# Patient Record
Sex: FEMALE | Race: WHITE | NOT HISPANIC OR LATINO | URBAN - METROPOLITAN AREA
[De-identification: names, ages, dates, MRNs, and addresses within clinical notes are randomized per-mention and may not be internally consistent; named-entity substitution may affect disease eponyms.]

---

## 2020-07-25 ENCOUNTER — TELEPHONE ENCOUNTER (OUTPATIENT)
Dept: URBAN - METROPOLITAN AREA CLINIC 13 | Facility: CLINIC | Age: 60
End: 2020-07-25

## 2020-07-25 RX ORDER — LACTULOSE 10 G/15ML
TAKE 30 ML TWICE DAILY SOLUTION ORAL
Qty: 60 | Refills: 3 | OUTPATIENT
Start: 2018-01-18 | End: 2018-04-26

## 2020-07-25 RX ORDER — CITALOPRAM HYDROBROMIDE 10 MG
TAKE 1 TABLET DAILY TABLET ORAL
Refills: 0 | OUTPATIENT
End: 2018-08-15

## 2020-07-25 RX ORDER — COLCHICINE 0.6 MG/1
TAKE 1 TABLET 3 TIMES DAILY TABLET, FILM COATED ORAL
Qty: 90 | Refills: 1 | OUTPATIENT
Start: 2018-09-28 | End: 2018-10-24

## 2020-07-25 RX ORDER — PRUCALOPRIDE 2 MG/1
TAKE 1 TABLET DAILY TABLET, FILM COATED ORAL
Qty: 30 | Refills: 1 | OUTPATIENT
Start: 2019-05-02 | End: 2019-07-07

## 2020-07-25 RX ORDER — LUBIPROSTONE 8 UG/1
TAKE 1 CAPSULE TWICE DAILY WITH FOOD CAPSULE, GELATIN COATED ORAL
Refills: 0 | OUTPATIENT
Start: 2007-06-01 | End: 2007-07-12

## 2020-07-25 RX ORDER — POLYETHYLENE GLYCOL 3350, SODIUM CHLORIDE, SODIUM BICARBONATE AND POTASSIUM CHLORIDE WITH LEMON FLAVOR 420; 11.2; 5.72; 1.48 G/4L; G/4L; G/4L; G/4L
USE AS DIRECTED POWDER, FOR SOLUTION ORAL
Qty: 1 | Refills: 0 | OUTPATIENT
Start: 2012-10-17 | End: 2017-07-14

## 2020-07-25 RX ORDER — POLYETHYLENE GLYCOL 3350, SODIUM CHLORIDE, SODIUM BICARBONATE AND POTASSIUM CHLORIDE WITH LEMON FLAVOR 420; 11.2; 5.72; 1.48 G/4L; G/4L; G/4L; G/4L
TAKE 1/2 GALLON AT 5:00 PM DAY BEFORE PROCEDURE, TAKE SECOND 1/2 OF GALLON 6 HRS PRIOR TO PROCEDURE POWDER, FOR SOLUTION ORAL
Qty: 1 | Refills: 0 | OUTPATIENT
Start: 2017-07-14 | End: 2018-01-18

## 2020-07-25 RX ORDER — LINACLOTIDE 145 UG/1
TAKE 1 CAPSULE DAILY EMPTY STOMACH, 30 MINUTES BEFORE BREAKFAST CAPSULE, GELATIN COATED ORAL
Qty: 30 | Refills: 5 | OUTPATIENT
Start: 2017-07-14 | End: 2019-08-07

## 2020-07-25 RX ORDER — COLCHICINE 0.6 MG/1
TAKE 1 TABLET TWICE DAILY AS DIRECTED TABLET, FILM COATED ORAL
Qty: 60 | Refills: 1 | OUTPATIENT
Start: 2012-10-17 | End: 2017-07-14

## 2020-07-25 RX ORDER — LINACLOTIDE 145 UG/1
TAKE  1  CAPSULE DAILY CAPSULE, GELATIN COATED ORAL
Qty: 90 | Refills: 3 | OUTPATIENT
End: 2018-08-15

## 2020-07-26 ENCOUNTER — TELEPHONE ENCOUNTER (OUTPATIENT)
Dept: URBAN - METROPOLITAN AREA CLINIC 13 | Facility: CLINIC | Age: 60
End: 2020-07-26

## 2020-07-26 RX ORDER — RIFAXIMIN 550 MG/1
TABLET ORAL
Qty: 42 | Refills: 0 | Status: ACTIVE | COMMUNITY
Start: 2019-08-20

## 2020-07-26 RX ORDER — OLMESARTAN MEDOXOMIL-HYDROCHLOROTHIAZIDE 12.5; 2 MG/1; MG/1
TAKE 1 TABLET DAILY TABLET, FILM COATED ORAL
Refills: 0 | Status: ACTIVE | COMMUNITY

## 2020-07-26 RX ORDER — TRIAZOLAM 0.25 MG/1
TABLET ORAL
Qty: 5 | Refills: 0 | Status: ACTIVE | COMMUNITY
Start: 2012-02-21

## 2020-07-26 RX ORDER — HYDROCODONE BITARTRATE AND ACETAMINOPHEN 5; 325 MG/1; MG/1
TABLET ORAL
Qty: 30 | Refills: 0 | Status: ACTIVE | COMMUNITY
Start: 2012-05-08

## 2020-07-26 RX ORDER — IBUPROFEN 800 MG/1
TABLET ORAL
Qty: 60 | Refills: 0 | Status: ACTIVE | COMMUNITY
Start: 2012-02-21

## 2020-07-26 RX ORDER — TRAZODONE HYDROCHLORIDE 50 MG/1
TABLET ORAL
Qty: 90 | Refills: 0 | Status: ACTIVE | COMMUNITY
Start: 2018-12-20

## 2020-07-26 RX ORDER — IBUPROFEN 800 MG/1
TABLET ORAL
Qty: 90 | Refills: 0 | Status: ACTIVE | COMMUNITY
Start: 2011-08-18

## 2020-07-26 RX ORDER — IBUPROFEN 600 MG/1
TABLET ORAL
Qty: 18 | Refills: 0 | Status: ACTIVE | COMMUNITY
Start: 2018-10-24

## 2020-07-26 RX ORDER — LOVASTATIN 40 MG/1
TAKE 1 TABLET DAILY AS DIRECTED TABLET ORAL
Refills: 0 | Status: ACTIVE | COMMUNITY

## 2020-07-26 RX ORDER — CITALOPRAM 20 MG/1
TABLET ORAL
Qty: 90 | Refills: 0 | Status: ACTIVE | COMMUNITY
Start: 2019-01-11

## 2020-07-26 RX ORDER — LISDEXAMFETAMINE DIMESYLATE 30 MG/1
CAPSULE ORAL
Qty: 30 | Refills: 0 | Status: ACTIVE | COMMUNITY
Start: 2019-04-01

## 2020-07-26 RX ORDER — DIETHYLPROPION HYDROCHLORIDE 75 MG/1
TABLET, EXTENDED RELEASE ORAL
Qty: 30 | Refills: 0 | Status: ACTIVE | COMMUNITY
Start: 2012-05-23

## 2020-07-26 RX ORDER — FLUTICASONE PROPIONATE 50 UG/1
SPRAY, METERED NASAL
Qty: 16 | Refills: 0 | Status: ACTIVE | COMMUNITY
Start: 2012-06-04

## 2020-07-26 RX ORDER — HYDROXYZINE HYDROCHLORIDE 25 MG/1
TABLET, FILM COATED ORAL
Qty: 20 | Refills: 0 | Status: ACTIVE | COMMUNITY
Start: 2019-03-14

## 2020-07-26 RX ORDER — AMOXICILLIN 500 MG/1
CAPSULE ORAL
Qty: 28 | Refills: 0 | Status: ACTIVE | COMMUNITY
Start: 2012-02-17

## 2020-07-26 RX ORDER — CHLORHEXIDINE GLUCONATE 0.12% ORAL RINSE 1.2 MG/ML
SOLUTION ORAL
Qty: 255 | Refills: 0 | Status: ACTIVE | COMMUNITY
Start: 2012-02-21

## 2020-07-26 RX ORDER — FLUCONAZOLE 150 MG/1
TABLET ORAL
Qty: 3 | Refills: 0 | Status: ACTIVE | COMMUNITY
Start: 2012-01-20

## 2020-07-26 RX ORDER — CLOTRIMAZOLE AND BETAMETHASONE DIPROPIONATE 10; .5 MG/G; MG/G
CREAM TOPICAL
Qty: 15 | Refills: 0 | Status: ACTIVE | COMMUNITY
Start: 2019-05-01

## 2020-07-26 RX ORDER — LISDEXAMFETAMINE DIMESYLATE 40 MG/1
TAKE 1 CAPSULE DAILY CAPSULE ORAL
Refills: 0 | Status: ACTIVE | COMMUNITY
Start: 2019-05-29

## 2020-07-26 RX ORDER — HYDROCODONE BITARTRATE AND HOMATROPINE METHYLBROMIDE 5; 1.5 MG/5ML; MG/5ML
SOLUTION ORAL
Qty: 240 | Refills: 0 | Status: ACTIVE | COMMUNITY
Start: 2012-11-18

## 2020-07-26 RX ORDER — LOVASTATIN 40 MG/1
TABLET ORAL
Qty: 90 | Refills: 0 | Status: ACTIVE | COMMUNITY
Start: 2011-05-23

## 2020-07-26 RX ORDER — NITROFURANTOIN MONOHYDRATE/MACROCRYSTALLINE 25; 75 MG/1; MG/1
CAPSULE ORAL
Qty: 14 | Refills: 0 | Status: ACTIVE | COMMUNITY
Start: 2012-06-25

## 2020-07-26 RX ORDER — PRUCALOPRIDE 2 MG/1
TAKE 1 TABLET DAILY TABLET, FILM COATED ORAL
Qty: 30 | Refills: 5 | Status: ACTIVE | COMMUNITY
Start: 2019-08-07

## 2020-07-26 RX ORDER — OXAPROZIN 600 MG/1
TABLET ORAL
Qty: 180 | Refills: 0 | Status: ACTIVE | COMMUNITY
Start: 2012-01-10

## 2020-07-26 RX ORDER — HYDROCODONE BITARTRATE AND ACETAMINOPHEN 7.5; 325 MG/1; MG/1
TABLET ORAL
Qty: 18 | Refills: 0 | Status: ACTIVE | COMMUNITY
Start: 2018-10-24

## 2020-07-26 RX ORDER — TRAMADOL HYDROCHLORIDE AND ACETAMINOPHEN 37.5; 325 MG/1; MG/1
TABLET, FILM COATED ORAL
Qty: 40 | Refills: 0 | Status: ACTIVE | COMMUNITY
Start: 2012-10-19

## 2020-07-26 RX ORDER — DIAZEPAM 10 MG/1
TABLET ORAL
Qty: 1 | Refills: 0 | Status: ACTIVE | COMMUNITY
Start: 2018-02-01

## 2020-07-26 RX ORDER — SENNOSIDES 25 MG
TAKE 1 TABLET DAILY PRN TABLET ORAL
Qty: 30 | Refills: 5 | Status: ACTIVE | COMMUNITY
Start: 2018-10-24

## 2020-07-26 RX ORDER — OXYCODONE AND ACETAMINOPHEN 5; 325 MG/1; MG/1
TABLET ORAL
Qty: 30 | Refills: 0 | Status: ACTIVE | COMMUNITY
Start: 2018-02-01

## 2020-07-26 RX ORDER — ACETAMINOPHEN 325 MG
TAKE 1 CAPSULE DAILY TABLET ORAL
Refills: 0 | Status: ACTIVE | COMMUNITY

## 2022-08-03 ENCOUNTER — WEB ENCOUNTER (OUTPATIENT)
Dept: URBAN - METROPOLITAN AREA CLINIC 113 | Facility: CLINIC | Age: 62
End: 2022-08-03

## 2022-08-03 ENCOUNTER — OFFICE VISIT (OUTPATIENT)
Dept: URBAN - METROPOLITAN AREA CLINIC 113 | Facility: CLINIC | Age: 62
End: 2022-08-03
Payer: MEDICARE

## 2022-08-03 VITALS
TEMPERATURE: 98.2 F | BODY MASS INDEX: 35.51 KG/M2 | DIASTOLIC BLOOD PRESSURE: 72 MMHG | HEART RATE: 59 BPM | HEIGHT: 62 IN | WEIGHT: 193 LBS | SYSTOLIC BLOOD PRESSURE: 109 MMHG

## 2022-08-03 DIAGNOSIS — K58.1 IRRITABLE BOWEL SYNDROME WITH CONSTIPATION: ICD-10-CM

## 2022-08-03 PROCEDURE — 99213 OFFICE O/P EST LOW 20 MIN: CPT | Performed by: INTERNAL MEDICINE

## 2022-08-03 RX ORDER — CITALOPRAM 10 MG/1
1 TABLET TABLET, FILM COATED ORAL ONCE A DAY
Status: ACTIVE | COMMUNITY

## 2022-08-03 RX ORDER — UBIDECARENONE/VIT E ACET 100MG-5
AS DIRECTED CAPSULE ORAL
Status: ACTIVE | COMMUNITY

## 2022-08-03 RX ORDER — LINACLOTIDE 145 UG/1
1 CAPSULE AT LEAST 30 MINUTES BEFORE THE FIRST MEAL OF THE DAY ON AN EMPTY STOMACH CAPSULE, GELATIN COATED ORAL ONCE A DAY
OUTPATIENT

## 2022-08-03 RX ORDER — CITALOPRAM 40 MG/1
0.5 TABLET TABLET, FILM COATED ORAL ONCE A DAY
Status: ACTIVE | COMMUNITY

## 2022-08-03 RX ORDER — OLMESARTAN MEDOXOMIL AND HYDROCHLOROTHIAZIDE 20/12.5 20; 12.5 MG/1; MG/1
1 TABLET TABLET ORAL ONCE A DAY
Status: ACTIVE | COMMUNITY

## 2022-08-03 RX ORDER — LINACLOTIDE 145 UG/1
1 CAPSULE AT LEAST 30 MINUTES BEFORE THE FIRST MEAL OF THE DAY ON AN EMPTY STOMACH CAPSULE, GELATIN COATED ORAL ONCE A DAY
Status: ACTIVE | COMMUNITY

## 2022-08-03 RX ORDER — LOVASTATIN 40 MG/1
TAKE 1 TABLET DAILY AS DIRECTED TABLET ORAL
Refills: 0 | Status: ACTIVE | COMMUNITY

## 2022-08-03 RX ORDER — MULTIVITAMIN
1 TABLET TABLET ORAL ONCE A DAY
Status: ACTIVE | COMMUNITY

## 2022-08-03 NOTE — PHYSICAL EXAM NEUROLOGIC:
[FreeTextEntry1] : PPD negative oriented to person, place and time , normal mood with an appropriate affect

## 2022-08-03 NOTE — HPI-TODAY'S VISIT:
Ms. Felder is a 62-year-old woman with a past medical history significant for gastric bypass, depression presenting for follow-up regarding issues of chronic slow transit constipation and pelvic floor dyssynergia.  She was last seen on 6/14/2019 for follow-up regarding these chronic issues.  She was recommended to continue dulcolax 145 mcg daily with Dulcolax daily as needed.  She was recommended to follow-up with [facility] motility [place] regarding biofeedback.  She has been trialed on Motegrity with no improvement.  She reports that she has been retired now for 1 year.  She feels that this is been very helpful for her GI issues.  She typically is having a bowel movement once a week taking 2 Linzess and 3 Dulcolax tablets.  She reports having labs by her primary care physician that were normal.  She has issues of increased gas and bloating most days of the week.  She denies any nausea, vomiting, heartburn or difficulty swallowing.

## 2022-08-03 NOTE — HPI-OTHER HISTORIES
Colonoscopy (12/8/17). BBPS 8. Removal of two 3mm tubular adenomas in the transverse colon, diverticulosis in the sigmoid colon, significant colonic spasm, and grade I-II nonbleeding internal hemorrhoids.  SmartPill (9/12/18): GET normal 3:12 (< 4 h normal), SBTT 1:40 (nomral 2.5-6h), :49 (unclear on tracing if capsule expelled during study, > 59 h indicates delayed colonic transit).  Anorectal manometry 2/13/19 : Dyssynergia defecation. EGD 3/29/19 by Dr. Mortensen : normal esophagus, mild antral erythema wih biopsies negative for H pylori, normal duodenal bulb, second and third parts of duodenum.

## 2022-08-09 ENCOUNTER — TELEPHONE ENCOUNTER (OUTPATIENT)
Dept: URBAN - METROPOLITAN AREA CLINIC 113 | Facility: CLINIC | Age: 62
End: 2022-08-09

## 2022-08-18 ENCOUNTER — TELEPHONE ENCOUNTER (OUTPATIENT)
Dept: URBAN - METROPOLITAN AREA CLINIC 113 | Facility: CLINIC | Age: 62
End: 2022-08-18

## 2022-08-18 PROBLEM — 440630006: Status: ACTIVE | Noted: 2022-08-03

## 2022-08-22 ENCOUNTER — WEB ENCOUNTER (OUTPATIENT)
Dept: URBAN - METROPOLITAN AREA CLINIC 113 | Facility: CLINIC | Age: 62
End: 2022-08-22

## 2022-08-22 RX ORDER — PRUCALOPRIDE 2 MG/1
1 TABLET TABLET, FILM COATED ORAL ONCE A DAY
Qty: 30 | Refills: 2 | OUTPATIENT
Start: 2022-09-14 | End: 2022-12-12

## 2022-10-11 ENCOUNTER — TELEPHONE ENCOUNTER (OUTPATIENT)
Dept: URBAN - METROPOLITAN AREA CLINIC 113 | Facility: CLINIC | Age: 62
End: 2022-10-11

## 2023-10-25 ENCOUNTER — CLAIMS CREATED FROM THE CLAIM WINDOW (OUTPATIENT)
Dept: URBAN - METROPOLITAN AREA CLINIC 113 | Facility: CLINIC | Age: 63
End: 2023-10-25
Payer: MEDICARE

## 2023-10-25 ENCOUNTER — DASHBOARD ENCOUNTERS (OUTPATIENT)
Age: 63
End: 2023-10-25

## 2023-10-25 ENCOUNTER — LAB OUTSIDE AN ENCOUNTER (OUTPATIENT)
Dept: URBAN - METROPOLITAN AREA CLINIC 113 | Facility: CLINIC | Age: 63
End: 2023-10-25

## 2023-10-25 VITALS
BODY MASS INDEX: 30.18 KG/M2 | WEIGHT: 164 LBS | RESPIRATION RATE: 16 BRPM | SYSTOLIC BLOOD PRESSURE: 90 MMHG | HEART RATE: 56 BPM | DIASTOLIC BLOOD PRESSURE: 66 MMHG | TEMPERATURE: 97.1 F | HEIGHT: 62 IN

## 2023-10-25 DIAGNOSIS — K58.1 IRRITABLE BOWEL SYNDROME WITH CONSTIPATION: ICD-10-CM

## 2023-10-25 DIAGNOSIS — Z86.010 HISTORY OF ADENOMATOUS POLYP OF COLON: ICD-10-CM

## 2023-10-25 PROBLEM — 429047008: Status: ACTIVE | Noted: 2023-10-25

## 2023-10-25 PROCEDURE — 99214 OFFICE O/P EST MOD 30 MIN: CPT | Performed by: NURSE PRACTITIONER

## 2023-10-25 RX ORDER — MULTIVITAMIN
1 TABLET TABLET ORAL ONCE A DAY
Status: ACTIVE | COMMUNITY

## 2023-10-25 RX ORDER — OLMESARTAN MEDOXOMIL AND HYDROCHLOROTHIAZIDE 20/12.5 20; 12.5 MG/1; MG/1
1 TABLET TABLET ORAL ONCE A DAY
Status: ACTIVE | COMMUNITY

## 2023-10-25 RX ORDER — LOVASTATIN 40 MG/1
TAKE 1 TABLET DAILY AS DIRECTED TABLET ORAL
Refills: 0 | Status: ACTIVE | COMMUNITY

## 2023-10-25 RX ORDER — CITALOPRAM 40 MG/1
0.5 TABLET TABLET, FILM COATED ORAL ONCE A DAY
Status: ACTIVE | COMMUNITY

## 2023-10-25 RX ORDER — TENAPANOR HYDROCHLORIDE 53.2 MG/1
1 TABLET IMMEDIATELY BEFORE MEALS TABLET ORAL TWICE A DAY
Qty: 180 TABLET | Refills: 3 | OUTPATIENT
Start: 2023-10-25 | End: 2024-10-19

## 2023-10-25 RX ORDER — LINACLOTIDE 145 UG/1
1 CAPSULE AT LEAST 30 MINUTES BEFORE THE FIRST MEAL OF THE DAY ON AN EMPTY STOMACH CAPSULE, GELATIN COATED ORAL ONCE A DAY
Status: ACTIVE | COMMUNITY

## 2023-10-25 RX ORDER — UBIDECARENONE/VIT E ACET 100MG-5
AS DIRECTED CAPSULE ORAL
Status: ACTIVE | COMMUNITY

## 2023-10-25 RX ORDER — CITALOPRAM 10 MG/1
1 TABLET TABLET, FILM COATED ORAL ONCE A DAY
Status: ACTIVE | COMMUNITY

## 2023-10-25 NOTE — HPI-TODAY'S VISIT:
This is a 63-year-old woman with a history of depression, gastric bypass, chronic slow transit constipation, pelvic floor dyssynergia, IBS presenting for follow-up. She was last seen in the office 8/3/2022 after an extended absence regarding chronic constipation attributed to slow transit and suspected pelvic floor dyssynergia.  She had tried multiple medications for constipation with minimal improvement.  She continued to conduct a bowel purge once a week with multiple Linzess and Dulcolax tablets.  She was to continue Linzess 145 mcg daily and prescribed Ibsrela 50 mg twice a day. A follow-up phone call was made on 8/19.  She returned to call 8/23/2022 reporting that she was responding to the medication but was having difficulty affording it.  Prior authorization.  Her insurance would not cover the medication.  She was prescribed Motegrity which was also not covered.  Dr. Valencia had no further recommendations.  A follow-up visit was recommended. She is currently taking 3 Dulcolax and Linzess 2 tablets once a week.  She has multiple bowel movements after this regimen and otherwise does not have stools.  She reports Ibsrela was effective.  She took it daily for 5 days and was having regular bowel movements.  She cannot afford the medication.  Her insurance has changed to Medicare with a supplement since it was initially prescribed. She denies abdominal pain or any other abdominal symptoms.

## 2023-10-26 ENCOUNTER — TELEPHONE ENCOUNTER (OUTPATIENT)
Dept: URBAN - METROPOLITAN AREA CLINIC 113 | Facility: CLINIC | Age: 63
End: 2023-10-26

## 2023-11-02 ENCOUNTER — TELEPHONE ENCOUNTER (OUTPATIENT)
Dept: URBAN - METROPOLITAN AREA CLINIC 113 | Facility: CLINIC | Age: 63
End: 2023-11-02

## 2023-11-16 ENCOUNTER — CLAIMS CREATED FROM THE CLAIM WINDOW (OUTPATIENT)
Dept: URBAN - METROPOLITAN AREA SURGERY CENTER 25 | Facility: SURGERY CENTER | Age: 63
End: 2023-11-16
Payer: MEDICARE

## 2023-11-16 DIAGNOSIS — Z86.010 ADENOMAS PERSONAL HISTORY OF COLONIC POLYPS: ICD-10-CM

## 2023-11-16 DIAGNOSIS — Z12.11 COLON CANCER SCREENING (HIGH RISK): ICD-10-CM

## 2023-11-16 DIAGNOSIS — Z09 CARDIOLOGY FOLLOW-UP ENCOUNTER: ICD-10-CM

## 2023-11-16 DIAGNOSIS — K57.30 COLON, DIVERTICULOSIS: ICD-10-CM

## 2023-11-16 DIAGNOSIS — K64.1 SECOND DEGREE HEMORRHOIDS: ICD-10-CM

## 2023-11-16 PROCEDURE — G0105 COLORECTAL SCRN; HI RISK IND: HCPCS | Performed by: INTERNAL MEDICINE

## 2023-11-16 PROCEDURE — G0105 COLORECTAL SCRN; HI RISK IND: HCPCS | Performed by: CLINIC/CENTER

## 2023-11-16 PROCEDURE — G8907 PT DOC NO EVENTS ON DISCHARG: HCPCS | Performed by: CLINIC/CENTER

## 2023-11-16 PROCEDURE — 00811 ANES LWR INTST NDSC NOS: CPT | Performed by: ANESTHESIOLOGIST ASSISTANT

## 2023-11-16 PROCEDURE — 00811 ANES LWR INTST NDSC NOS: CPT | Performed by: ANESTHESIOLOGY

## 2023-11-16 RX ORDER — CITALOPRAM 10 MG/1
1 TABLET TABLET, FILM COATED ORAL ONCE A DAY
Status: ACTIVE | COMMUNITY

## 2023-11-16 RX ORDER — LINACLOTIDE 145 UG/1
1 CAPSULE AT LEAST 30 MINUTES BEFORE THE FIRST MEAL OF THE DAY ON AN EMPTY STOMACH CAPSULE, GELATIN COATED ORAL ONCE A DAY
Status: ACTIVE | COMMUNITY

## 2023-11-16 RX ORDER — CITALOPRAM 40 MG/1
0.5 TABLET TABLET, FILM COATED ORAL ONCE A DAY
Status: ACTIVE | COMMUNITY

## 2023-11-16 RX ORDER — OLMESARTAN MEDOXOMIL AND HYDROCHLOROTHIAZIDE 20/12.5 20; 12.5 MG/1; MG/1
1 TABLET TABLET ORAL ONCE A DAY
Status: ACTIVE | COMMUNITY

## 2023-11-16 RX ORDER — UBIDECARENONE/VIT E ACET 100MG-5
AS DIRECTED CAPSULE ORAL
Status: ACTIVE | COMMUNITY

## 2023-11-16 RX ORDER — MULTIVITAMIN
1 TABLET TABLET ORAL ONCE A DAY
Status: ACTIVE | COMMUNITY

## 2023-11-16 RX ORDER — TENAPANOR HYDROCHLORIDE 53.2 MG/1
1 TABLET IMMEDIATELY BEFORE MEALS TABLET ORAL TWICE A DAY
Qty: 180 TABLET | Refills: 3 | Status: ACTIVE | COMMUNITY
Start: 2023-10-25 | End: 2024-10-19

## 2023-11-16 RX ORDER — LOVASTATIN 40 MG/1
TAKE 1 TABLET DAILY AS DIRECTED TABLET ORAL
Refills: 0 | Status: ACTIVE | COMMUNITY

## 2024-01-12 ENCOUNTER — OFFICE VISIT (OUTPATIENT)
Dept: URBAN - METROPOLITAN AREA CLINIC 113 | Facility: CLINIC | Age: 64
End: 2024-01-12

## 2024-10-24 ENCOUNTER — OFFICE VISIT (OUTPATIENT)
Dept: URBAN - METROPOLITAN AREA CLINIC 113 | Facility: CLINIC | Age: 64
End: 2024-10-24

## 2024-10-24 VITALS
WEIGHT: 158.6 LBS | BODY MASS INDEX: 29.19 KG/M2 | HEART RATE: 62 BPM | SYSTOLIC BLOOD PRESSURE: 99 MMHG | DIASTOLIC BLOOD PRESSURE: 72 MMHG | RESPIRATION RATE: 20 BRPM | TEMPERATURE: 97.5 F | HEIGHT: 62 IN

## 2024-10-24 RX ORDER — LINACLOTIDE 290 UG/1
1 CAPSULE AT LEAST 30 MINUTES BEFORE THE FIRST MEAL OF THE DAY ON AN EMPTY STOMACH CAPSULE, GELATIN COATED ORAL ONCE A DAY
Status: ACTIVE | COMMUNITY

## 2024-10-24 RX ORDER — CITALOPRAM 40 MG/1
0.5 TABLET TABLET, FILM COATED ORAL ONCE A DAY
Status: ON HOLD | COMMUNITY

## 2024-10-24 RX ORDER — MULTIVITAMIN
1 TABLET TABLET ORAL ONCE A DAY
Status: ACTIVE | COMMUNITY

## 2024-10-24 RX ORDER — OLMESARTAN MEDOXOMIL AND HYDROCHLOROTHIAZIDE 20; 12.5 MG/1; MG/1
1 TABLET TABLET ORAL ONCE A DAY
Status: ACTIVE | COMMUNITY

## 2024-10-24 RX ORDER — LOVASTATIN 40 MG/1
TAKE 1 TABLET DAILY AS DIRECTED TABLET ORAL
Refills: 0 | Status: ACTIVE | COMMUNITY

## 2024-10-24 RX ORDER — CITALOPRAM 10 MG/1
1 TABLET TABLET, FILM COATED ORAL ONCE A DAY
Status: ON HOLD | COMMUNITY

## 2024-10-24 RX ORDER — UBIDECARENONE/VIT E ACET 100MG-5
AS DIRECTED CAPSULE ORAL
Status: ACTIVE | COMMUNITY

## 2024-11-07 PROBLEM — 82934008: Status: ACTIVE | Noted: 2024-11-07

## 2024-11-07 PROBLEM — 428283002: Status: ACTIVE | Noted: 2024-11-07

## 2025-01-28 ENCOUNTER — WEB ENCOUNTER (OUTPATIENT)
Dept: URBAN - METROPOLITAN AREA CLINIC 113 | Facility: CLINIC | Age: 65
End: 2025-01-28